# Patient Record
Sex: MALE | URBAN - METROPOLITAN AREA
[De-identification: names, ages, dates, MRNs, and addresses within clinical notes are randomized per-mention and may not be internally consistent; named-entity substitution may affect disease eponyms.]

---

## 2022-01-06 ENCOUNTER — OUT OF OFFICE VISIT (OUTPATIENT)
Dept: URBAN - METROPOLITAN AREA MEDICAL CENTER 12 | Facility: MEDICAL CENTER | Age: 57
End: 2022-01-06
Payer: MEDICARE

## 2022-01-06 DIAGNOSIS — Z87.11 H/O PEPTIC ULCER: ICD-10-CM

## 2022-01-06 DIAGNOSIS — D64.89 ANEMIA DUE TO OTHER CAUSE: ICD-10-CM

## 2022-01-06 DIAGNOSIS — K92.1 ACUTE MELENA: ICD-10-CM

## 2022-01-06 DIAGNOSIS — R74.01 ABNORMAL/ELEVATED TRANSAMINASE (SGOT, AMINOTRANSFERASE): ICD-10-CM

## 2022-01-06 PROCEDURE — 99232 SBSQ HOSP IP/OBS MODERATE 35: CPT | Performed by: INTERNAL MEDICINE

## 2022-01-06 PROCEDURE — G8427 DOCREV CUR MEDS BY ELIG CLIN: HCPCS | Performed by: INTERNAL MEDICINE

## 2022-01-06 PROCEDURE — 99222 1ST HOSP IP/OBS MODERATE 55: CPT | Performed by: INTERNAL MEDICINE

## 2022-01-09 ENCOUNTER — OUT OF OFFICE VISIT (OUTPATIENT)
Dept: URBAN - METROPOLITAN AREA MEDICAL CENTER 12 | Facility: MEDICAL CENTER | Age: 57
End: 2022-01-09
Payer: MEDICARE

## 2022-01-09 DIAGNOSIS — R79.89 ELEVATED TROPONIN: ICD-10-CM

## 2022-01-09 DIAGNOSIS — Z87.11 H/O PEPTIC ULCER: ICD-10-CM

## 2022-01-09 DIAGNOSIS — K92.1 ACUTE MELENA: ICD-10-CM

## 2022-01-09 DIAGNOSIS — Z79.1 ENCNTR LONG-TERM NSAID USE: ICD-10-CM

## 2022-01-09 PROCEDURE — 99232 SBSQ HOSP IP/OBS MODERATE 35: CPT | Performed by: INTERNAL MEDICINE

## 2022-01-10 ENCOUNTER — OUT OF OFFICE VISIT (OUTPATIENT)
Dept: URBAN - METROPOLITAN AREA MEDICAL CENTER 12 | Facility: MEDICAL CENTER | Age: 57
End: 2022-01-10
Payer: MEDICARE

## 2022-01-10 DIAGNOSIS — K92.1 ACUTE MELENA: ICD-10-CM

## 2022-01-10 DIAGNOSIS — Z79.1 ENCNTR LONG-TERM NSAID USE: ICD-10-CM

## 2022-01-10 DIAGNOSIS — Z87.11 H/O GASTRIC ULCER: ICD-10-CM

## 2022-01-10 DIAGNOSIS — U07.1 COVID: ICD-10-CM

## 2022-01-10 PROCEDURE — 99232 SBSQ HOSP IP/OBS MODERATE 35: CPT | Performed by: PHYSICIAN ASSISTANT

## 2022-01-11 ENCOUNTER — OUT OF OFFICE VISIT (OUTPATIENT)
Dept: URBAN - METROPOLITAN AREA MEDICAL CENTER 12 | Facility: MEDICAL CENTER | Age: 57
End: 2022-01-11
Payer: MEDICARE

## 2022-01-11 DIAGNOSIS — Z79.1 ENCNTR LONG-TERM NSAID USE: ICD-10-CM

## 2022-01-11 DIAGNOSIS — Z87.11 H/O GASTRIC ULCER: ICD-10-CM

## 2022-01-11 DIAGNOSIS — K92.1 ACUTE MELENA: ICD-10-CM

## 2022-01-11 PROCEDURE — 99232 SBSQ HOSP IP/OBS MODERATE 35: CPT | Performed by: INTERNAL MEDICINE

## 2022-01-12 ENCOUNTER — OUT OF OFFICE VISIT (OUTPATIENT)
Dept: URBAN - METROPOLITAN AREA MEDICAL CENTER 12 | Facility: MEDICAL CENTER | Age: 57
End: 2022-01-12
Payer: MEDICARE

## 2022-01-12 DIAGNOSIS — K92.1 ACUTE MELENA: ICD-10-CM

## 2022-01-12 DIAGNOSIS — Z87.11 H/O GASTRIC ULCER: ICD-10-CM

## 2022-01-12 DIAGNOSIS — Z79.1 ENCNTR LONG-TERM NSAID USE: ICD-10-CM

## 2022-01-12 DIAGNOSIS — U07.1 COVID: ICD-10-CM

## 2022-01-12 PROCEDURE — 99232 SBSQ HOSP IP/OBS MODERATE 35: CPT | Performed by: STUDENT IN AN ORGANIZED HEALTH CARE EDUCATION/TRAINING PROGRAM

## 2022-01-14 ENCOUNTER — OUT OF OFFICE VISIT (OUTPATIENT)
Dept: URBAN - METROPOLITAN AREA MEDICAL CENTER 12 | Facility: MEDICAL CENTER | Age: 57
End: 2022-01-14
Payer: MEDICARE

## 2022-01-14 DIAGNOSIS — K25.9 ANTRAL ULCER: ICD-10-CM

## 2022-01-14 DIAGNOSIS — K92.1 ACUTE MELENA: ICD-10-CM

## 2022-01-14 DIAGNOSIS — K26.9 CHILDHOOD DUODENAL ULCER: ICD-10-CM

## 2022-01-14 PROCEDURE — 43239 EGD BIOPSY SINGLE/MULTIPLE: CPT | Performed by: INTERNAL MEDICINE

## 2024-05-29 ENCOUNTER — CLAIMS CREATED FROM THE CLAIM WINDOW (OUTPATIENT)
Dept: URBAN - METROPOLITAN AREA MEDICAL CENTER 33 | Facility: MEDICAL CENTER | Age: 59
End: 2024-05-29

## 2024-05-29 PROCEDURE — 99222 1ST HOSP IP/OBS MODERATE 55: CPT

## 2024-05-30 ENCOUNTER — CLAIMS CREATED FROM THE CLAIM WINDOW (OUTPATIENT)
Dept: URBAN - METROPOLITAN AREA MEDICAL CENTER 33 | Facility: MEDICAL CENTER | Age: 59
End: 2024-05-30

## 2024-05-30 PROCEDURE — 99232 SBSQ HOSP IP/OBS MODERATE 35: CPT

## 2024-05-31 ENCOUNTER — CLAIMS CREATED FROM THE CLAIM WINDOW (OUTPATIENT)
Dept: URBAN - METROPOLITAN AREA MEDICAL CENTER 33 | Facility: MEDICAL CENTER | Age: 59
End: 2024-05-31

## 2024-06-01 ENCOUNTER — CLAIMS CREATED FROM THE CLAIM WINDOW (OUTPATIENT)
Dept: URBAN - METROPOLITAN AREA MEDICAL CENTER 33 | Facility: MEDICAL CENTER | Age: 59
End: 2024-06-01
Payer: MEDICARE

## 2024-06-01 DIAGNOSIS — K25.9 ANTRAL ULCER: ICD-10-CM

## 2024-06-01 DIAGNOSIS — K26.4 BLEEDING CHRONIC DUODENAL ULCER: ICD-10-CM

## 2024-06-01 PROCEDURE — 99232 SBSQ HOSP IP/OBS MODERATE 35: CPT | Performed by: INTERNAL MEDICINE

## 2024-06-02 ENCOUNTER — CLAIMS CREATED FROM THE CLAIM WINDOW (OUTPATIENT)
Dept: URBAN - METROPOLITAN AREA MEDICAL CENTER 33 | Facility: MEDICAL CENTER | Age: 59
End: 2024-06-02
Payer: MEDICARE

## 2024-06-02 DIAGNOSIS — K92.2 ACUTE GASTROINTESTINAL BLEEDING: ICD-10-CM

## 2024-06-02 PROCEDURE — 99232 SBSQ HOSP IP/OBS MODERATE 35: CPT | Performed by: INTERNAL MEDICINE

## 2024-06-24 ENCOUNTER — CLAIMS CREATED FROM THE CLAIM WINDOW (OUTPATIENT)
Dept: URBAN - METROPOLITAN AREA MEDICAL CENTER 33 | Facility: MEDICAL CENTER | Age: 59
End: 2024-06-24
Payer: MEDICARE

## 2024-06-24 DIAGNOSIS — K22.10 ULCER OF ESOPHAGUS WITHOUT BLEEDING: ICD-10-CM

## 2024-06-24 DIAGNOSIS — K26.9 DUODENAL ULCER: ICD-10-CM

## 2024-06-24 DIAGNOSIS — K25.9 GASTRIC ULCER WITHOUT HEMORRHAGE OR PERFORATION, UNSPECIFIED CHRONICITY: ICD-10-CM

## 2024-06-24 PROCEDURE — 43235 EGD DIAGNOSTIC BRUSH WASH: CPT | Performed by: INTERNAL MEDICINE

## 2024-06-25 ENCOUNTER — CLAIMS CREATED FROM THE CLAIM WINDOW (OUTPATIENT)
Dept: URBAN - METROPOLITAN AREA MEDICAL CENTER 33 | Facility: MEDICAL CENTER | Age: 59
End: 2024-06-25

## 2024-06-25 PROCEDURE — 99232 SBSQ HOSP IP/OBS MODERATE 35: CPT

## 2024-06-26 ENCOUNTER — CLAIMS CREATED FROM THE CLAIM WINDOW (OUTPATIENT)
Dept: URBAN - METROPOLITAN AREA MEDICAL CENTER 33 | Facility: MEDICAL CENTER | Age: 59
End: 2024-06-26
Payer: MEDICARE

## 2024-06-26 DIAGNOSIS — D64.89 ANEMIA DUE TO OTHER CAUSE: ICD-10-CM

## 2024-06-26 DIAGNOSIS — K92.1 ACUTE MELENA: ICD-10-CM

## 2024-06-26 DIAGNOSIS — K25.9 ANTRAL ULCER: ICD-10-CM

## 2024-06-26 DIAGNOSIS — K26.9 CHILDHOOD DUODENAL ULCER: ICD-10-CM

## 2024-06-26 PROCEDURE — 99232 SBSQ HOSP IP/OBS MODERATE 35: CPT | Performed by: INTERNAL MEDICINE

## 2024-06-27 ENCOUNTER — CLAIMS CREATED FROM THE CLAIM WINDOW (OUTPATIENT)
Dept: URBAN - METROPOLITAN AREA MEDICAL CENTER 33 | Facility: MEDICAL CENTER | Age: 59
End: 2024-06-27

## 2024-06-27 PROCEDURE — 99232 SBSQ HOSP IP/OBS MODERATE 35: CPT

## 2024-06-28 ENCOUNTER — CLAIMS CREATED FROM THE CLAIM WINDOW (OUTPATIENT)
Dept: URBAN - METROPOLITAN AREA MEDICAL CENTER 33 | Facility: MEDICAL CENTER | Age: 59
End: 2024-06-28

## 2024-06-28 PROCEDURE — 99232 SBSQ HOSP IP/OBS MODERATE 35: CPT

## 2024-07-16 ENCOUNTER — OFFICE VISIT (OUTPATIENT)
Dept: URBAN - METROPOLITAN AREA CLINIC 105 | Facility: CLINIC | Age: 59
End: 2024-07-16

## 2024-07-16 RX ORDER — TAMSULOSIN HYDROCHLORIDE 0.4 MG/1
TAKE 1 CAPSULE BY MOUTH AT BEDTIME CAPSULE ORAL
Qty: 30 EACH | Refills: 0 | Status: ACTIVE | COMMUNITY

## 2024-07-16 RX ORDER — FERROUS SULFATE TAB 325 MG (65 MG ELEMENTAL FE) 325 (65 FE) MG
TAKE 1 TABLET BY MOUTH EVERY DAY WITH BREAKFAST TAB ORAL
Qty: 30 EACH | Refills: 0 | Status: ACTIVE | COMMUNITY

## 2024-07-16 RX ORDER — TRAMADOL HYDROCHLORIDE 50 MG/1
TAKE 1 TABLET BY MOUTH EVERY 6 TO 8 HOURS AS NEEDED TABLET, COATED ORAL
Qty: 30 EACH | Refills: 0 | Status: ACTIVE | COMMUNITY

## 2024-07-16 RX ORDER — CLINDAMYCIN HYDROCHLORIDE 300 MG/1
TAKE 1 CAPSULE BY MOUTH EVERY 6 HOURS FOR 5 DAYS CAPSULE ORAL
Qty: 20 EACH | Refills: 0 | Status: ACTIVE | COMMUNITY

## 2024-07-16 RX ORDER — SITAGLIPTIN 100 MG/1
TAKE 1/2 TABLET BY MOUTH DAILY TABLET, FILM COATED ORAL
Qty: 15 EACH | Refills: 0 | Status: ACTIVE | COMMUNITY

## 2024-07-16 RX ORDER — GABAPENTIN 300 MG/1
CAPSULE ORAL
Qty: 90 CAPSULE | Status: ACTIVE | COMMUNITY

## 2024-07-16 RX ORDER — CHOLECALCIFEROL (VITAMIN D3) 1250 MCG
TAKE 1 CAPSULE BY MOUTH 1 TIME A WEEK FOR 8 WEEKS CAPSULE ORAL
Qty: 8 EACH | Refills: 0 | Status: ACTIVE | COMMUNITY

## 2024-07-16 RX ORDER — METHOCARBAMOL TABLETS 500 MG/1
TAKE 2 TABLET BY MOUTH FOUR TIMES DAILY TABLET, COATED ORAL
Qty: 20 EACH | Refills: 0 | Status: ACTIVE | COMMUNITY

## 2024-07-16 RX ORDER — OMEPRAZOLE 20 MG/1
TAKE 1 CAPSULE BY MOUTH EVERY DAY CAPSULE, DELAYED RELEASE ORAL
Qty: 90 EACH | Refills: 0 | Status: ACTIVE | COMMUNITY

## 2024-07-16 RX ORDER — LEVOTHYROXINE SODIUM 0.03 MG/1
TAKE 1 TABLET BY MOUTH DAILY TABLET ORAL
Qty: 90 EACH | Refills: 0 | Status: ACTIVE | COMMUNITY

## 2024-07-16 RX ORDER — DICLOFENAC SODIUM 10 MG/G
APPLY 2 GRAMS TOPICALLY TO AFFECTED JOINT THREE TIMES DAILY GEL TOPICAL
Qty: 100 GRAM | Refills: 0 | Status: ACTIVE | COMMUNITY

## 2024-07-16 RX ORDER — VENLAFAXINE 75 MG/1
TAKE 1 TABLET BY MOUTH DAILY TABLET ORAL
Qty: 90 EACH | Refills: 0 | Status: ACTIVE | COMMUNITY

## 2024-07-16 RX ORDER — ALLOPURINOL 300 MG/1
TAKE 1 TABLET BY MOUTH DAILY TABLET ORAL
Qty: 90 EACH | Refills: 0 | Status: ACTIVE | COMMUNITY

## 2024-07-16 RX ORDER — INSULIN DETEMIR 100 [IU]/ML
INJECT 20 UNITS SUBCUTANEOUS EVERY MORNING AND 60 UNITS SUBCUTANEOUS EVERY NIGHT AT BEDTIME INJECTION, SOLUTION SUBCUTANEOUS
Qty: 40 MILLILITER | Refills: 1 | Status: ACTIVE | COMMUNITY

## 2024-07-16 RX ORDER — INSULIN ASPART 100 [IU]/ML
INJECT 20 UNITS UNDER THE SKIN THREE TIMES DAILY WITH MEALS INJECTION, SOLUTION INTRAVENOUS; SUBCUTANEOUS
Qty: 20 MILLILITER | Refills: 1 | Status: ACTIVE | COMMUNITY

## 2024-07-16 RX ORDER — HYDRALAZINE HYDROCHLORIDE 25 MG/1
TABLET ORAL
Qty: 180 TABLET | Status: ACTIVE | COMMUNITY

## 2024-07-16 RX ORDER — SULFAMETHOXAZOLE AND TRIMETHOPRIM 800; 160 MG/1; MG/1
TAKE 1 TABLET BY MOUTH TWICE DAILY FOR 7 DAYS TABLET ORAL
Qty: 14 EACH | Refills: 0 | Status: ACTIVE | COMMUNITY

## 2024-07-16 RX ORDER — APIXABAN 5 MG/1
TAKE 1 TABLET BY MOUTH TWICE DAILY TABLET, FILM COATED ORAL
Qty: 60 EACH | Refills: 0 | Status: ACTIVE | COMMUNITY

## 2024-07-16 RX ORDER — HYDRALAZINE HYDROCHLORIDE 25 MG/1
TAKE 2 TABLETS BY MOUTH THREE TIMES DAILY TABLET ORAL
Qty: 180 EACH | Refills: 0 | Status: ACTIVE | COMMUNITY

## 2024-07-16 RX ORDER — PANTOPRAZOLE SODIUM 40 MG/1
TAKE 1 TABLET BY MOUTH DAILY TABLET, DELAYED RELEASE ORAL
Qty: 90 EACH | Refills: 0 | Status: ACTIVE | COMMUNITY

## 2024-07-16 RX ORDER — BACLOFEN 10 MG/1
TABLET ORAL
Qty: 180 EACH | Refills: 0 | Status: ACTIVE | COMMUNITY

## 2024-07-16 RX ORDER — PRAVASTATIN SODIUM 80 MG/1
TABLET ORAL
Qty: 90 TABLET | Status: ACTIVE | COMMUNITY

## 2024-07-16 RX ORDER — OMEGA-3S/DHA/EPA/FISH OIL 980-1400MG
TAKE ONE CAPSULE BY MOUTH EVERY NIGHT AT BEDTIME CAPSULE,DELAYED RELEASE (ENTERIC COATED) ORAL
Qty: 90 EACH | Refills: 0 | Status: ACTIVE | COMMUNITY

## 2024-07-16 RX ORDER — SUCRALFATE 1 G/1
TAKE 1 TABLET BY MOUTH FOUR TIMES DAILY BEFORE EACH MEAL AND NIGHTLY AT BEDTIME TABLET ORAL
Qty: 120 EACH | Refills: 0 | Status: ACTIVE | COMMUNITY

## 2024-07-16 RX ORDER — LOSARTAN POTASSIUM 25 MG/1
TABLET, FILM COATED ORAL
Qty: 90 TABLET | Status: ACTIVE | COMMUNITY

## 2024-07-16 RX ORDER — NIFEDIPINE 30 MG/1
TAKE 1 TABLET BY MOUTH DAILY TABLET, FILM COATED, EXTENDED RELEASE ORAL
Qty: 90 EACH | Refills: 0 | Status: ACTIVE | COMMUNITY

## 2025-03-01 ENCOUNTER — CLAIMS CREATED FROM THE CLAIM WINDOW (OUTPATIENT)
Dept: URBAN - METROPOLITAN AREA MEDICAL CENTER 39 | Facility: MEDICAL CENTER | Age: 60
End: 2025-03-01
Payer: MEDICARE

## 2025-03-01 DIAGNOSIS — K92.0 HEMATEMESIS: ICD-10-CM

## 2025-03-01 PROCEDURE — 99232 SBSQ HOSP IP/OBS MODERATE 35: CPT | Performed by: INTERNAL MEDICINE

## 2025-03-02 ENCOUNTER — CLAIMS CREATED FROM THE CLAIM WINDOW (OUTPATIENT)
Dept: URBAN - METROPOLITAN AREA MEDICAL CENTER 39 | Facility: MEDICAL CENTER | Age: 60
End: 2025-03-02
Payer: MEDICARE

## 2025-03-02 DIAGNOSIS — K92.0 HEMATEMESIS: ICD-10-CM

## 2025-03-02 PROCEDURE — 99232 SBSQ HOSP IP/OBS MODERATE 35: CPT | Performed by: INTERNAL MEDICINE

## 2025-03-03 ENCOUNTER — CLAIMS CREATED FROM THE CLAIM WINDOW (OUTPATIENT)
Dept: URBAN - METROPOLITAN AREA MEDICAL CENTER 39 | Facility: MEDICAL CENTER | Age: 60
End: 2025-03-03
Payer: MEDICARE

## 2025-03-03 DIAGNOSIS — K29.50 CHRONIC GASTRITIS: ICD-10-CM

## 2025-03-03 DIAGNOSIS — K22.10 ESOPHAGEAL ULCER: ICD-10-CM

## 2025-03-03 DIAGNOSIS — K92.0 HEMATEMESIS: ICD-10-CM

## 2025-03-03 PROCEDURE — 43239 EGD BIOPSY SINGLE/MULTIPLE: CPT | Performed by: INTERNAL MEDICINE

## 2025-03-03 PROCEDURE — 43235 EGD DIAGNOSTIC BRUSH WASH: CPT | Performed by: INTERNAL MEDICINE

## 2025-03-04 ENCOUNTER — CLAIMS CREATED FROM THE CLAIM WINDOW (OUTPATIENT)
Dept: URBAN - METROPOLITAN AREA MEDICAL CENTER 39 | Facility: MEDICAL CENTER | Age: 60
End: 2025-03-04
Payer: MEDICARE

## 2025-03-04 DIAGNOSIS — K92.0 COFFEE GROUND EMESIS: ICD-10-CM

## 2025-03-04 PROCEDURE — 99232 SBSQ HOSP IP/OBS MODERATE 35: CPT | Performed by: INTERNAL MEDICINE

## 2025-03-06 ENCOUNTER — TELEPHONE ENCOUNTER (OUTPATIENT)
Dept: URBAN - METROPOLITAN AREA CLINIC 98 | Facility: CLINIC | Age: 60
End: 2025-03-06

## 2025-03-06 NOTE — HPI-TODAY'S VISIT:
\*Unknown; \* \* A. Gastric biopsies: Mild chronic gastritis. No Helicobacter pylori identified on immunoperoxidase stain.; See microscopic description. \~ B. Gastroesophageal junction biopsies: Portions of squamous and glandular mucosa present. Glandular mucosa demonstrates mild acute and chronic inflammation. No Helicobacter pylori identified on immunoperoxidase stain. See microscopic description. \~ C. Esophageal biopsies at 35 cm: Ulceration with associated granulation tissue with marked acute and chronic inflammation. See microscopic description. \~ D. Esophageal biopsies at 33 cm: Focal ulceration with associated acute and chronic inflammation (specimen predominantly represents histologically unremarkable-appearing  squamous-lined esophageal mucosa). See microscopic description. \~ E. Esophageal biopsies of upper third: Histologically unremarkable-appearing squamous-lined esophageal mucosa. See microscopic description. \~ Signout location: [facility] Electronically signed by Tommy Gao MD on 3/6/2025 at 1040 Special Studies Block A1 - Nonmicrobe Alcian blue stain Block A1 - Helicobacter immunoperoxidase stain \~ Block B1 - Microbe PAS stain Block B1 - Nonmicrobe Alcian blue stain Block B1 - Helicobacter immunoperoxidase stain Block B1 - Cytokeratin immunoperoxidase stain \~ Block C1 - Microbe GMS stain Block C1 - Cytokeratin immunoperoxidase stain Block C1 - CMV immunoperoxidase stain Block C1 - HSV immunoperoxidase stain \~ Block D1 - Microbe GMS stain \~ Block E1 - Microbe GMS stain Microscopic Description Sections of the gastric biopsies (Part A) show mild chronic inflammation.  No intestinal metaplasia is identified on routine H&E stain.  Examination  of the Alcian blue stain reveals no goblet cells.  Examination of the Helicobacter immunoperoxidase stain reveals no Helicobacter pylori.  There is no evidence of dysplasia or malignancy. \~ Sections of the gastroesophageal junction biopsies (Part B) show portions of squamous and glandular mucosa.  The squamous epithelium demonstrates no significant histologic abnormalities (no fungi  are identified on examination of the PAS stain).  The glandular mucosa shows mild acute and chronic inflammation.  No intestinal metaplasia is identified on routine H&E stain.  Examination of the Alcian blue stain reveals no goblet cells.  Examination  of the Helicobacter immunoperoxidase stain reveals no Helicobacter pylori.  Examination of the cytokeratin immunoperoxidase stain reveals no evidence of an infiltrative epithelial process.  There is no diagnostic histologic evidence of Rodriges's esophagus.   There is no evidence of dysplasia or malignancy. \~ Sections of the esophageal biopsies at 35 cm (Part C) show ulceration with associated granulation tissue with marked acute and chronic inflammation.  No viral changes are identified on routine H&E  stain (the CMV and HSV immunohistochemical stains are negative for viral inclusions).  Examination of the cytokeratin immunohistochemical stain reveals no evidence of an infiltrative epithelial process.  No fungi are identified on examination of the GMS  stain.  There is also a single piece of more intact tissue which represents squamous and glandular mucosa.  The squamous epithelium demonstrates no significant histologic abnormalities.  The glandular mucosa shows mild chronic inflammation.  No intestinal  metaplasia is identified on routine H&E stain.  There is no diagnostic histologic evidence of Rodriges's esophagus.  There is no evidence of dysplasia or malignancy. \~ Sections of the esophageal biopsies at 33 cm (Part D) show predominantly portions of histologically unremarkable-appearing squamous epithelium.  No intraepithelial inflammation is identified (there  is no evidence of eosinophilic esophagitis).  No fungi are identified on examination of the GMS stain.  There is a single small focus of ulceration with associated acute and chronic inflammation.  There is no diagnostic histologic evidence of Rodriges's  esophagus.  There is no evidence of dysplasia or malignancy. \~ Sections of the biopsies of the upper third of the esophagus (Part E) show portions of histologically unremarkable-appearing squamous epithelium.  No intraepithelial inflammation is identified (there  is no evidence of eosinophilic esophagitis).  No fungi are identified on examination of the GMS stain.  There is no evidence of dysplasia or malignancy. \~